# Patient Record
Sex: FEMALE | Race: WHITE | Employment: FULL TIME | URBAN - METROPOLITAN AREA
[De-identification: names, ages, dates, MRNs, and addresses within clinical notes are randomized per-mention and may not be internally consistent; named-entity substitution may affect disease eponyms.]

---

## 2018-10-17 ENCOUNTER — HOSPITAL ENCOUNTER (OUTPATIENT)
Age: 38
Discharge: HOME | End: 2018-10-19

## 2018-10-17 PROCEDURE — 88304 TISSUE EXAM BY PATHOLOGIST: CPT

## 2019-05-16 ENCOUNTER — HOSPITAL ENCOUNTER (EMERGENCY)
Age: 39
Discharge: HOME | End: 2019-05-16
Attending: EMERGENCY MEDICINE
Payer: COMMERCIAL

## 2019-05-16 VITALS
OXYGEN SATURATION: 99 % | HEART RATE: 73 BPM | WEIGHT: 180 LBS | TEMPERATURE: 98.1 F | BODY MASS INDEX: 28.93 KG/M2 | DIASTOLIC BLOOD PRESSURE: 70 MMHG | SYSTOLIC BLOOD PRESSURE: 122 MMHG | RESPIRATION RATE: 20 BRPM | HEIGHT: 66 IN

## 2019-05-16 DIAGNOSIS — R11.2 NON-INTRACTABLE VOMITING WITH NAUSEA, UNSPECIFIED VOMITING TYPE: Primary | ICD-10-CM

## 2019-05-16 DIAGNOSIS — R19.7 DIARRHEA, UNSPECIFIED TYPE: ICD-10-CM

## 2019-05-16 PROCEDURE — 96374 THER/PROPH/DIAG INJ IV PUSH: CPT

## 2019-05-16 PROCEDURE — 2580000003 HC RX 258: Performed by: EMERGENCY MEDICINE

## 2019-05-16 PROCEDURE — 99283 EMERGENCY DEPT VISIT LOW MDM: CPT

## 2019-05-16 PROCEDURE — 6360000002 HC RX W HCPCS: Performed by: EMERGENCY MEDICINE

## 2019-05-16 RX ORDER — 0.9 % SODIUM CHLORIDE 0.9 %
1000 INTRAVENOUS SOLUTION INTRAVENOUS ONCE
Status: DISCONTINUED | OUTPATIENT
Start: 2019-05-16 | End: 2019-05-16 | Stop reason: HOSPADM

## 2019-05-16 RX ORDER — ONDANSETRON 4 MG/1
4 TABLET, ORALLY DISINTEGRATING ORAL EVERY 8 HOURS PRN
Qty: 10 TABLET | Refills: 0 | Status: SHIPPED | OUTPATIENT
Start: 2019-05-16 | End: 2020-05-15

## 2019-05-16 RX ORDER — ONDANSETRON 2 MG/ML
4 INJECTION INTRAMUSCULAR; INTRAVENOUS ONCE
Status: COMPLETED | OUTPATIENT
Start: 2019-05-16 | End: 2019-05-16

## 2019-05-16 RX ADMIN — ONDANSETRON 4 MG: 2 INJECTION INTRAMUSCULAR; INTRAVENOUS at 03:42

## 2019-05-16 ASSESSMENT — ENCOUNTER SYMPTOMS
VOMITING: 1
COUGH: 0
EYE DISCHARGE: 0
SHORTNESS OF BREATH: 0
SORE THROAT: 0
DIARRHEA: 1
EYE PAIN: 0
EYE REDNESS: 0
WHEEZING: 0
SINUS PRESSURE: 0
BACK PAIN: 0
NAUSEA: 1
ABDOMINAL DISTENTION: 0

## 2019-05-16 ASSESSMENT — PAIN DESCRIPTION - PAIN TYPE: TYPE: ACUTE PAIN

## 2019-05-16 ASSESSMENT — PAIN DESCRIPTION - ORIENTATION: ORIENTATION: LEFT;MID

## 2019-05-16 ASSESSMENT — PAIN DESCRIPTION - LOCATION: LOCATION: ABDOMEN

## 2019-05-16 NOTE — ED PROVIDER NOTES
Musculoskeletal: Normal range of motion. She exhibits no edema. Neurological: She is alert and oriented to person, place, and time. Skin: Skin is warm and dry. No rash noted. She is not diaphoretic. Nursing note and vitals reviewed. Procedures    [facility]         [id number]. Patient refuses EKG, urine pregnancy and urinalysis. States her deductible is $6000 and these tests are unnecessary. 0350. Patient refuses labs. She will agree to IV zofran and fluids. --------------------------------------------- PAST HISTORY ---------------------------------------------  Past Medical History:  has no past medical history on file. Past Surgical History:  has a past surgical history that includes Cholecystectomy and  section. Social History:  reports that she quit smoking about 8 years ago. She has a 2.50 pack-year smoking history. She has never used smokeless tobacco. She reports that she does not drink alcohol or use drugs. Family History: family history includes Cancer in her maternal grandmother; High Blood Pressure in her father; High Cholesterol in her father. The patients home medications have been reviewed. Allergies: Augmentin [amoxicillin-pot clavulanate] and Demerol    -------------------------------------------------- RESULTS -------------------------------------------------  Labs:  No results found for this visit on 19. Radiology:  No orders to display       ------------------------- NURSING NOTES AND VITALS REVIEWED ---------------------------  Date / Time Roomed:  2019  2:58 AM  ED Bed Assignment:      The nursing notes within the ED encounter and vital signs as below have been reviewed. /70   Pulse 73   Temp 98.1 °F (36.7 °C) (Oral)   Resp 20   Ht 5' 6\" (1.676 m)   Wt 180 lb (81.6 kg)   LMP 2019   SpO2 99%   BMI 29.05 kg/m²   Oxygen Saturation Interpretation: Normal    This patient has chosen to leave against medical advice.   I

## 2019-05-16 NOTE — ED NOTES
Pt. Has declined all blood work/urine/EKG. Agrees to IV and IV medications. Dr. Byron Knutson RN  05/16/19 0047

## 2019-12-19 ENCOUNTER — HOSPITAL ENCOUNTER (OUTPATIENT)
Age: 39
Discharge: HOME | End: 2019-12-19
Payer: COMMERCIAL

## 2019-12-19 ENCOUNTER — HOSPITAL ENCOUNTER (OUTPATIENT)
Dept: CT IMAGING | Age: 39
Discharge: HOME | End: 2019-12-19
Payer: COMMERCIAL

## 2019-12-19 DIAGNOSIS — R10.31 ABDOMINAL PAIN, RIGHT LOWER QUADRANT: ICD-10-CM

## 2019-12-19 LAB
BASOPHILS ABSOLUTE: 0.05 E9/L (ref 0–0.2)
BASOPHILS RELATIVE PERCENT: 0.4 % (ref 0–2)
EOSINOPHILS ABSOLUTE: 0.15 E9/L (ref 0.05–0.5)
EOSINOPHILS RELATIVE PERCENT: 1.2 % (ref 0–6)
HCT VFR BLD CALC: 39.9 % (ref 34–48)
HEMOGLOBIN: 13.5 G/DL (ref 11.5–15.5)
IMMATURE GRANULOCYTES #: 0.05 E9/L
IMMATURE GRANULOCYTES %: 0.4 % (ref 0–5)
LYMPHOCYTES ABSOLUTE: 3.59 E9/L (ref 1.5–4)
LYMPHOCYTES RELATIVE PERCENT: 29.2 % (ref 20–42)
MCH RBC QN AUTO: 30.2 PG (ref 26–35)
MCHC RBC AUTO-ENTMCNC: 33.8 % (ref 32–34.5)
MCV RBC AUTO: 89.3 FL (ref 80–99.9)
MONOCYTES ABSOLUTE: 0.57 E9/L (ref 0.1–0.95)
MONOCYTES RELATIVE PERCENT: 4.6 % (ref 2–12)
NEUTROPHILS ABSOLUTE: 7.87 E9/L (ref 1.8–7.3)
NEUTROPHILS RELATIVE PERCENT: 64.2 % (ref 43–80)
PDW BLD-RTO: 11.9 FL (ref 11.5–15)
PLATELET # BLD: 272 E9/L (ref 130–450)
PMV BLD AUTO: 10.6 FL (ref 7–12)
RBC # BLD: 4.47 E12/L (ref 3.5–5.5)
WBC # BLD: 12.3 E9/L (ref 4.5–11.5)

## 2019-12-19 PROCEDURE — 36415 COLL VENOUS BLD VENIPUNCTURE: CPT

## 2019-12-19 PROCEDURE — 85025 COMPLETE CBC W/AUTO DIFF WBC: CPT

## 2019-12-19 PROCEDURE — 74177 CT ABD & PELVIS W/CONTRAST: CPT

## 2019-12-19 PROCEDURE — 6360000004 HC RX CONTRAST MEDICATION: Performed by: RADIOLOGY

## 2019-12-19 RX ADMIN — IOHEXOL 50 ML: 240 INJECTION, SOLUTION INTRATHECAL; INTRAVASCULAR; INTRAVENOUS; ORAL at 13:58

## 2019-12-19 RX ADMIN — IOPAMIDOL 80 ML: 755 INJECTION, SOLUTION INTRAVENOUS at 13:59

## 2022-03-10 ENCOUNTER — HOSPITAL ENCOUNTER (OUTPATIENT)
Dept: NON INVASIVE DIAGNOSTICS | Age: 42
Discharge: HOME | End: 2022-03-10
Payer: COMMERCIAL

## 2022-03-10 PROCEDURE — 93225 XTRNL ECG REC<48 HRS REC: CPT

## 2022-03-10 PROCEDURE — 93226 XTRNL ECG REC<48 HR SCAN A/R: CPT

## 2022-06-27 NOTE — PROGRESS NOTES
[facility],[address] and [facility] Electrophysiology  Consultation Report  PATIENT: Alek Foster  MEDICAL RECORD NUMBER: [id number]  DATE OF SERVICE:  2022  ATTENDING ELECTROPHYSIOLOGIST: Keisha Mc MD  REFERRING PHYSICIAN: Steve Mendes DO and FRANDY PRESLEY JR  CHIEF COMPLAINT: Palpitations and abnormal cardiac monitor    HPI: This is a 39 y.o. female with a history of   Patient Active Problem List   Diagnosis    Foreign body granuloma of skin and subcutaneous tissue   who presents to cardiac electrophysiology clinic for consultation of palpitations and abnormal cardiac monitor. Ms. Anaya Cook followed up with her PCP and due to her complaints of palpitations she was asked to wear a 48 hour HM. The monitor showed 6 beats of SVT at 160 bpm, occasional PVC and PACs. She reports her palpitations are random, last a few seconds and she is unaware of any notable triggers. She presents today in SR. The patient denies any chest pain, dyspnea, dizziness, syncope, orthopnea or paroxysmal nocturnal dyspnea. She denies any SCD in the family. Patient Active Problem List    Diagnosis Date Noted    Foreign body granuloma of skin and subcutaneous tissue 2016       Past Medical History:   Diagnosis Date    History of Holter monitoring 03/10/2022       Family History   Problem Relation Age of Onset    High Blood Pressure Father     High Cholesterol Father     Cancer Maternal Grandmother        Social History     Tobacco Use    Smoking status: Former Smoker     Packs/day: 0.50     Years: 5.00     Pack years: 2.50     Quit date: 3/30/2011     Years since quittin.2    Smokeless tobacco: Never Used   Substance Use Topics    Alcohol use: No       Current Outpatient Medications   Medication Sig Dispense Refill    NONFORMULARY Sometimes takes probiotic multi or vitamin c. No current facility-administered medications for this visit.         Allergies   Allergen Reactions    Augmentin [Amoxicillin-Pot Clavulanate] Hives    Demerol Nausea And Vomiting     ROS:   Constitutional: Negative for fever, activity change and appetite change. HENT: Negative for epistaxis. Eyes: Negative for diploplia, blurred vision. Respiratory: Negative for cough, chest tightness, shortness of breath and wheezing. Cardiovascular: pertinent positives in HPI  Gastrointestinal: Negative for abdominal pain and blood in stool. All other review of systems are negative     PHYSICAL EXAM:   Vitals:    06/30/22 0915   BP: 120/80   Pulse: 60   Resp: 16   Weight: 189 lb 6.4 oz (85.9 kg)   Height: 5' 6\" (1.676 m)      Constitutional: Well-developed, no acute distress  Eyes: conjunctivae normal, no xanthelasma   Ears, Nose, Throat: oral mucosa moist, no cyanosis   CV: no JVD. Regular rate and rhythm. Normal S1S2 and no S3. No murmurs, rubs, or gallops. PMI is nondisplaced  Lungs: clear to auscultation bilaterally, normal respiratory effort without used of accessory muscles  Abdomen: soft, non-tender, bowel sounds present, no masses or hepatomegaly   Musculoskeletal: no digital clubbing, no edema   Skin: warm, no rashes     I have personally reviewed the laboratory, cardiac diagnostic and radiographic testing as outlined below:    Data:    No results for input(s): WBC, HGB, HCT, PLT in the last 72 hours. No results for input(s): NA, K, CL, CO2, BUN, CREATININE, GLU, CALCIUM in the last 72 hours. Invalid input(s): MAGNESIUM   No results found for: MG  No results for input(s): TSH in the last 72 hours. No results for input(s): INR in the last 72 hours. EKG 6/30/22: SR, rate: 60bpm, QTc 422 ms. No delta waves - Please see scan in Cardiology. Echocardiogram: 2/25/2022          Outpatient Monitor: 48 hour HM: 3/10/2022      I have independently reviewed all of the ECGs and rhythm strips per above     Assessment/Plan:  This is a 39 y.o. female with a history of   Patient Active Problem List Diagnosis    Foreign body granuloma of skin and subcutaneous tissue    who presents with palpitations, SVT. 1. Palpitations  - Unclear etiology. - 48 hour HM on 3/2022 showed PACs, PVCs and SVT but unclear whether were correlated with symptoms or not. - Echocardiogram 2/2022 showed normal LV function.  - Can not exclude paroxysmal arrhyuthmias. 2. Paroxysmal supraventricular tachycardia  - 6 beats of SVT noted on 48 hour HM from 3/2022. - Symptomatic per patient reported. Recommendations:  1. 14 day cardiac monitor to try to correlate symptoms with arrhythmia. 2. Advised to avoid caffeine and alcohol intake. 3. Follow up in 3 months. Encouraged the patient to call the office for any questions or concerns. I have spent a total of 60 minutes with the patient and the family reviewing the above stated recommendations. And a total of >50% of that time involved face-to-face time providing counseling and or coordination of care with the other providers, preparation for the clinic visit, reviewing records/tests, counseling/education of the patient, ordering medications/tests/procedures, coordinating care, and documenting clinical information in the EHR. Thank you for allowing me to participate in your patient's care. Please call me if there are any questions or concerns.       Teresa Minor MD  Cardiac Electrophysiology  [address]  [facility] [place]  6/30/22   9:26 AM

## 2022-06-30 ENCOUNTER — OFFICE VISIT (OUTPATIENT)
Dept: NON INVASIVE DIAGNOSTICS | Age: 42
End: 2022-06-30
Payer: COMMERCIAL

## 2022-06-30 VITALS
HEIGHT: 66 IN | RESPIRATION RATE: 16 BRPM | WEIGHT: 189.4 LBS | BODY MASS INDEX: 30.44 KG/M2 | SYSTOLIC BLOOD PRESSURE: 120 MMHG | DIASTOLIC BLOOD PRESSURE: 80 MMHG | HEART RATE: 60 BPM

## 2022-06-30 DIAGNOSIS — R00.2 PALPITATION: Primary | ICD-10-CM

## 2022-06-30 PROCEDURE — G8427 DOCREV CUR MEDS BY ELIG CLIN: HCPCS | Performed by: INTERNAL MEDICINE

## 2022-06-30 PROCEDURE — 99205 OFFICE O/P NEW HI 60 MIN: CPT | Performed by: INTERNAL MEDICINE

## 2022-06-30 PROCEDURE — 93000 ELECTROCARDIOGRAM COMPLETE: CPT | Performed by: INTERNAL MEDICINE

## 2022-06-30 PROCEDURE — 1036F TOBACCO NON-USER: CPT | Performed by: INTERNAL MEDICINE

## 2022-06-30 PROCEDURE — G8419 CALC BMI OUT NRM PARAM NOF/U: HCPCS | Performed by: INTERNAL MEDICINE

## 2022-07-01 ENCOUNTER — TELEPHONE (OUTPATIENT)
Dept: NON INVASIVE DIAGNOSTICS | Age: 42
End: 2022-07-01

## 2022-07-05 NOTE — TELEPHONE ENCOUNTER
I spoke to [facility] AN AFFILIATE OF [facility] and she has an apple watch so she will records some episodes and the email the tracings to me.

## 2023-01-13 ENCOUNTER — TELEPHONE (OUTPATIENT)
Dept: NON INVASIVE DIAGNOSTICS | Age: 43
End: 2023-01-13

## 2023-01-13 NOTE — TELEPHONE ENCOUNTER
Spoke to the patient and she said that she was unable to do the heart monitor due to having a bad allergic reaction to the ptch. She did not even wear it for 1 day and had blisters. She called the company and there was no other options out there for her to wear the monitor. She since has went to her PCP and they have done blood testing and stuff and she started on a supplement magnesium OTC. She said that her palpitation episodes are less frequent but still there. She is not sure if she wants to follow up because if she cannot wear the monitor she is unsure what else can be done. The patient will call if she needs a appointment int he future but right now she is going to hold off on scheduling.     Ck pt,overdue 3 mo OV (09/30/2022) no device no AAD w/ CK

## 2023-04-20 ENCOUNTER — OFFICE VISIT (OUTPATIENT)
Dept: ENT CLINIC | Age: 43
End: 2023-04-20
Payer: COMMERCIAL

## 2023-04-20 VITALS
HEIGHT: 66 IN | HEART RATE: 81 BPM | DIASTOLIC BLOOD PRESSURE: 93 MMHG | SYSTOLIC BLOOD PRESSURE: 128 MMHG | BODY MASS INDEX: 30.37 KG/M2 | WEIGHT: 189 LBS

## 2023-04-20 DIAGNOSIS — E04.1 THYROID NODULE: Primary | ICD-10-CM

## 2023-04-20 DIAGNOSIS — M54.2 NECK PAIN: ICD-10-CM

## 2023-04-20 DIAGNOSIS — H92.09 OTALGIA, UNSPECIFIED LATERALITY: ICD-10-CM

## 2023-04-20 PROCEDURE — G8417 CALC BMI ABV UP PARAM F/U: HCPCS | Performed by: OTOLARYNGOLOGY

## 2023-04-20 PROCEDURE — 1036F TOBACCO NON-USER: CPT | Performed by: OTOLARYNGOLOGY

## 2023-04-20 PROCEDURE — 99203 OFFICE O/P NEW LOW 30 MIN: CPT | Performed by: OTOLARYNGOLOGY

## 2023-04-20 PROCEDURE — G8427 DOCREV CUR MEDS BY ELIG CLIN: HCPCS | Performed by: OTOLARYNGOLOGY

## 2023-04-20 RX ORDER — ATENOLOL 25 MG/1
12.5 TABLET ORAL DAILY
COMMUNITY
Start: 2023-04-13

## 2023-04-26 ENCOUNTER — TELEPHONE (OUTPATIENT)
Dept: ENT CLINIC | Age: 43
End: 2023-04-26

## 2023-10-31 ENCOUNTER — TELEPHONE (OUTPATIENT)
Dept: ENT CLINIC | Age: 43
End: 2023-10-31

## 2023-10-31 NOTE — TELEPHONE ENCOUNTER
Patient is established with Dr Magan Wong. She saw him for neck pain, thyroid issue. Patient has now been scheduled with Chela Patel for left ear pain on 11/8. She is calling in because Dr Tonya Alegria had her get a CT scan at [address] [place] [address],[address] and they found a mass on left palantine tonsil. Dr Tonya Alegria then advised the patient to call to see if her appointment can be moved to sooner date and time. I reached out to office for Chela Patel, and was advised to place pe in chart so they can investigate as the patient is already established with Dr Magan Wong.

## 2023-11-09 ENCOUNTER — OFFICE VISIT (OUTPATIENT)
Dept: ENT CLINIC | Age: 43
End: 2023-11-09
Payer: COMMERCIAL

## 2023-11-09 VITALS
HEIGHT: 66 IN | DIASTOLIC BLOOD PRESSURE: 95 MMHG | WEIGHT: 188.6 LBS | HEART RATE: 132 BPM | BODY MASS INDEX: 30.31 KG/M2 | SYSTOLIC BLOOD PRESSURE: 131 MMHG

## 2023-11-09 DIAGNOSIS — J35.8 TONSIL ASYMMETRY: Primary | ICD-10-CM

## 2023-11-09 PROCEDURE — 1036F TOBACCO NON-USER: CPT | Performed by: OTOLARYNGOLOGY

## 2023-11-09 PROCEDURE — G8417 CALC BMI ABV UP PARAM F/U: HCPCS | Performed by: OTOLARYNGOLOGY

## 2023-11-09 PROCEDURE — 99213 OFFICE O/P EST LOW 20 MIN: CPT | Performed by: OTOLARYNGOLOGY

## 2023-11-09 PROCEDURE — G8484 FLU IMMUNIZE NO ADMIN: HCPCS | Performed by: OTOLARYNGOLOGY

## 2023-11-09 PROCEDURE — G8427 DOCREV CUR MEDS BY ELIG CLIN: HCPCS | Performed by: OTOLARYNGOLOGY

## 2023-11-09 NOTE — PROGRESS NOTES
11/9/23: Pt here for tonsil mass, last office visit for Thyroid nodule was 4/26/23. Has bilateral ear pain and periodic sore throat. No difficulty swallowing, but can feel herself swallowing harder or like something is in throat. No change in voice. Drinks 3 glasses of water, 1 cup of coffee and 1 can of pop, and no alcohol. Weight is stable.

## 2023-11-22 ENCOUNTER — APPOINTMENT (OUTPATIENT)
Dept: OTOLARYNGOLOGY | Facility: CLINIC | Age: 43
End: 2023-11-22
Payer: COMMERCIAL

## 2024-07-17 NOTE — PROGRESS NOTES
packs/day: 0.5 packs/day for 5.0 years (2.5 ttl pk-yrs)     Types: Cigarettes     Start date: 3/30/2006     Quit date: 3/30/2011     Years since quittin.3    Smokeless tobacco: Never   Substance Use Topics    Alcohol use: No       Current Outpatient Medications   Medication Sig Dispense Refill    buPROPion (WELLBUTRIN XL) 150 MG extended release tablet       atenolol (TENORMIN) 25 MG tablet Take 1 tablet by mouth daily      NONFORMULARY Sometimes takes probiotic multi or vitamin c.       No current facility-administered medications for this visit.        Allergies   Allergen Reactions    Amoxicillin Anaphylaxis     Added due to reaction to Augmentin    Augmentin [Amoxicillin-Pot Clavulanate] Hives    Demerol Nausea And Vomiting     ROS:   Constitutional: Negative for fever, activity change and appetite change.   HENT: Negative for epistaxis.   Eyes: Negative for diploplia, blurred vision.   Respiratory: Negative for cough, chest tightness, shortness of breath and wheezing.   Cardiovascular: pertinent positives in HPI  Gastrointestinal: Negative for abdominal pain and blood in stool.   All other review of systems are negative     PHYSICAL EXAM:   Vitals:    24 0944   BP: 102/70   Pulse: 56   Resp: 18   Weight: 98 kg (216 lb)   Height: 1.613 m (5' 3.5\")        Constitutional: Well-developed, no acute distress  Eyes: conjunctivae normal, no xanthelasma   Ears, Nose, Throat: oral mucosa moist, no cyanosis   CV: no JVD. Regular rate and rhythm. Normal S1S2 and no S3. No murmurs, rubs, or gallops. PMI is nondisplaced  Lungs: clear to auscultation bilaterally, normal respiratory effort without used of accessory muscles  Abdomen: soft, non-tender, bowel sounds present, no masses or hepatomegaly   Musculoskeletal: no digital clubbing, no edema   Skin: warm, no rashes     I have personally reviewed the laboratory, cardiac diagnostic and radiographic testing as outlined below:    Data:    No results for input(s):

## 2024-07-18 ENCOUNTER — OFFICE VISIT (OUTPATIENT)
Dept: NON INVASIVE DIAGNOSTICS | Age: 44
End: 2024-07-18
Payer: COMMERCIAL

## 2024-07-18 VITALS
RESPIRATION RATE: 18 BRPM | WEIGHT: 216 LBS | DIASTOLIC BLOOD PRESSURE: 70 MMHG | BODY MASS INDEX: 36.88 KG/M2 | HEART RATE: 56 BPM | SYSTOLIC BLOOD PRESSURE: 102 MMHG | HEIGHT: 64 IN

## 2024-07-18 DIAGNOSIS — R00.2 PALPITATIONS: Primary | ICD-10-CM

## 2024-07-18 PROCEDURE — G8417 CALC BMI ABV UP PARAM F/U: HCPCS | Performed by: INTERNAL MEDICINE

## 2024-07-18 PROCEDURE — G8427 DOCREV CUR MEDS BY ELIG CLIN: HCPCS | Performed by: INTERNAL MEDICINE

## 2024-07-18 PROCEDURE — 93000 ELECTROCARDIOGRAM COMPLETE: CPT | Performed by: INTERNAL MEDICINE

## 2024-07-18 PROCEDURE — 99215 OFFICE O/P EST HI 40 MIN: CPT | Performed by: INTERNAL MEDICINE

## 2024-07-18 PROCEDURE — 1036F TOBACCO NON-USER: CPT | Performed by: INTERNAL MEDICINE

## 2024-07-18 RX ORDER — BUPROPION HYDROCHLORIDE 150 MG/1
TABLET ORAL
COMMUNITY
Start: 2024-07-11

## 2025-05-12 NOTE — PROGRESS NOTES
[facility]  Outpatient Progress Note  PATIENT: Lucía Chaidez  MEDICAL RECORD NUMBER: [id number]  DATE OF SERVICE:  5/13/2025  ATTENDING ELECTROPHYSIOLOGIST: Luis Carlos Peguero MD  REFERRING PHYSICIAN: No ref. provider found and Zach Manzo Jr. (Inactive)  CHIEF COMPLAINT: Palpitations and abnormal cardiac monitor which showed SVT    HPI: This is a 44 y.o. female with a history of   Patient Active Problem List   Diagnosis    Foreign body granuloma of skin and subcutaneous tissue   who presents to cardiac electrophysiology clinic for follow up and management of palpitations and abnormal cardiac monitor which showed SVT. Ms. Chaidez followed up with her PCP and due to her complaints of palpitations she was asked to wear a 48 hour HM 3/16/22. The monitor showed 6 beats of SVT at 160 bpm, occasional PVC and PACs. In office on  in June 2022 she was advised to have ILR insertion due to unable to allergic reaction to Zio patch which she opted to use Apple watch to monitor the symptoms.  She last saw Dr. Peguero in office on 07/18/2024 and was encouraged [facility]. Today she noted episodes of palpitation that occur no more frequently that weekly and are resolved with taking Atenolol when using a pulse Ox she notes her heart rates in the 130's during theses episodes. She also has complaints of chest pain with deep inspiration, no complaints of exertional chest pain or dyspnea. She also notes episodes of woozyiness and headaches that she is experiencing at her appointment today of note she is in sinus and has a normal BP. The patient denies any  dyspnea,  syncope, orthopnea or paroxysmal nocturnal dyspnea.            Patient Active Problem List    Diagnosis Date Noted    Foreign body granuloma of skin and subcutaneous tissue 09/09/2016       Past Medical History:   Diagnosis Date    History of Holter monitoring 03/10/2022

## 2025-05-13 ENCOUNTER — OFFICE VISIT (OUTPATIENT)
Dept: NON INVASIVE DIAGNOSTICS | Age: 45
End: 2025-05-13
Payer: COMMERCIAL

## 2025-05-13 VITALS
SYSTOLIC BLOOD PRESSURE: 128 MMHG | HEART RATE: 59 BPM | HEIGHT: 65 IN | OXYGEN SATURATION: 98 % | DIASTOLIC BLOOD PRESSURE: 70 MMHG | WEIGHT: 209.2 LBS | TEMPERATURE: 97.5 F | BODY MASS INDEX: 34.85 KG/M2 | RESPIRATION RATE: 16 BRPM

## 2025-05-13 DIAGNOSIS — I49.9 IRREGULAR HEART BEAT: Primary | ICD-10-CM

## 2025-05-13 DIAGNOSIS — I49.1 PAC (PREMATURE ATRIAL CONTRACTION): ICD-10-CM

## 2025-05-13 PROCEDURE — 99214 OFFICE O/P EST MOD 30 MIN: CPT | Performed by: NURSE PRACTITIONER

## 2025-05-13 PROCEDURE — G8417 CALC BMI ABV UP PARAM F/U: HCPCS | Performed by: NURSE PRACTITIONER

## 2025-05-13 PROCEDURE — 1036F TOBACCO NON-USER: CPT | Performed by: NURSE PRACTITIONER

## 2025-05-13 PROCEDURE — 93000 ELECTROCARDIOGRAM COMPLETE: CPT | Performed by: INTERNAL MEDICINE

## 2025-05-13 PROCEDURE — G8427 DOCREV CUR MEDS BY ELIG CLIN: HCPCS | Performed by: NURSE PRACTITIONER

## 2025-05-13 RX ORDER — ESCITALOPRAM OXALATE 10 MG/1
10 TABLET ORAL DAILY
COMMUNITY
Start: 2025-05-09

## 2025-05-13 NOTE — PATIENT INSTRUCTIONS
Avoid caffeine is much as possible, just 1 cup of coffee daily.  Continue to avoid alcohol  Establish exercise routine 30 minutes 5 times a day ideally walking, recumbent bike, swimming.  Continue atenolol as prescribed.  Monitor your heart rate at home via an Apple watch or Image[employer].